# Patient Record
Sex: FEMALE | Race: WHITE
[De-identification: names, ages, dates, MRNs, and addresses within clinical notes are randomized per-mention and may not be internally consistent; named-entity substitution may affect disease eponyms.]

---

## 2019-04-04 ENCOUNTER — HOSPITAL ENCOUNTER (EMERGENCY)
Dept: HOSPITAL 62 - ER | Age: 24
Discharge: HOME | End: 2019-04-04
Payer: SELF-PAY

## 2019-04-04 VITALS — DIASTOLIC BLOOD PRESSURE: 63 MMHG | SYSTOLIC BLOOD PRESSURE: 96 MMHG

## 2019-04-04 DIAGNOSIS — R19.7: ICD-10-CM

## 2019-04-04 DIAGNOSIS — N75.0: ICD-10-CM

## 2019-04-04 DIAGNOSIS — R11.10: Primary | ICD-10-CM

## 2019-04-04 LAB
ABSOLUTE LYMPHOCYTES# (MANUAL): 0.2 10^3/UL (ref 0.5–4.7)
ABSOLUTE MONOCYTES # (MANUAL): 0.5 10^3/UL (ref 0.1–1.4)
ABSOLUTE NEUTROPHILS# (MANUAL): 15 10^3/UL (ref 1.7–8.2)
ADD MANUAL DIFF: YES
ALBUMIN SERPL-MCNC: 5.4 G/DL (ref 3.5–5)
ALP SERPL-CCNC: 96 U/L (ref 38–126)
ALT SERPL-CCNC: 75 U/L (ref 9–52)
ANION GAP SERPL CALC-SCNC: 15 MMOL/L (ref 5–19)
APPEARANCE UR: (no result)
APTT PPP: (no result) S
AST SERPL-CCNC: 60 U/L (ref 14–36)
BASOPHILS NFR BLD MANUAL: 0 % (ref 0–2)
BILIRUB DIRECT SERPL-MCNC: 0.4 MG/DL (ref 0–0.4)
BILIRUB SERPL-MCNC: 1.3 MG/DL (ref 0.2–1.3)
BILIRUB UR QL STRIP: NEGATIVE
BUN SERPL-MCNC: 21 MG/DL (ref 7–20)
CALCIUM: 10.7 MG/DL (ref 8.4–10.2)
CHLORIDE SERPL-SCNC: 105 MMOL/L (ref 98–107)
CO2 SERPL-SCNC: 24 MMOL/L (ref 22–30)
EOSINOPHIL NFR BLD MANUAL: 0 % (ref 0–6)
ERYTHROCYTE [DISTWIDTH] IN BLOOD BY AUTOMATED COUNT: 14.1 % (ref 11.5–14)
GLUCOSE SERPL-MCNC: 125 MG/DL (ref 75–110)
GLUCOSE UR STRIP-MCNC: 50 MG/DL
HCT VFR BLD CALC: 49.5 % (ref 36–47)
HGB BLD-MCNC: 16.8 G/DL (ref 12–15.5)
KETONES UR STRIP-MCNC: NEGATIVE MG/DL
LIPASE SERPL-CCNC: 24.6 U/L (ref 23–300)
MCH RBC QN AUTO: 28.6 PG (ref 27–33.4)
MCHC RBC AUTO-ENTMCNC: 33.9 G/DL (ref 32–36)
MCV RBC AUTO: 84 FL (ref 80–97)
MONOCYTES % (MANUAL): 3 % (ref 3–13)
NEUTS BAND NFR BLD MANUAL: 1 % (ref 3–5)
NITRITE UR QL STRIP: NEGATIVE
PH UR STRIP: 6 [PH] (ref 5–9)
PLATELET # BLD: 242 10^3/UL (ref 150–450)
PLATELET COMMENT: ADEQUATE
POTASSIUM SERPL-SCNC: 4.9 MMOL/L (ref 3.6–5)
PROT SERPL-MCNC: 9.3 G/DL (ref 6.3–8.2)
PROT UR STRIP-MCNC: 100 MG/DL
RBC # BLD AUTO: 5.86 10^6/UL (ref 3.72–5.28)
RBC MORPH BLD: (no result)
SEGMENTED NEUTROPHILS % (MAN): 95 % (ref 42–78)
SODIUM SERPL-SCNC: 143.5 MMOL/L (ref 137–145)
SP GR UR STRIP: 1.03
TOTAL CELLS COUNTED BLD: 100
UROBILINOGEN UR-MCNC: NEGATIVE MG/DL (ref ?–2)
VARIANT LYMPHS NFR BLD MANUAL: 1 % (ref 13–45)
WBC # BLD AUTO: 15.6 10^3/UL (ref 4–10.5)
WBC TOXIC VACUOLES BLD QL SMEAR: PRESENT

## 2019-04-04 PROCEDURE — 85025 COMPLETE CBC W/AUTO DIFF WBC: CPT

## 2019-04-04 PROCEDURE — 87205 SMEAR GRAM STAIN: CPT

## 2019-04-04 PROCEDURE — 87045 FECES CULTURE AEROBIC BACT: CPT

## 2019-04-04 PROCEDURE — 87086 URINE CULTURE/COLONY COUNT: CPT

## 2019-04-04 PROCEDURE — S0119 ONDANSETRON 4 MG: HCPCS

## 2019-04-04 PROCEDURE — 87493 C DIFF AMPLIFIED PROBE: CPT

## 2019-04-04 PROCEDURE — 81001 URINALYSIS AUTO W/SCOPE: CPT

## 2019-04-04 PROCEDURE — 36415 COLL VENOUS BLD VENIPUNCTURE: CPT

## 2019-04-04 PROCEDURE — 83690 ASSAY OF LIPASE: CPT

## 2019-04-04 PROCEDURE — 74177 CT ABD & PELVIS W/CONTRAST: CPT

## 2019-04-04 PROCEDURE — 96360 HYDRATION IV INFUSION INIT: CPT

## 2019-04-04 PROCEDURE — 80053 COMPREHEN METABOLIC PANEL: CPT

## 2019-04-04 PROCEDURE — 81025 URINE PREGNANCY TEST: CPT

## 2019-04-04 PROCEDURE — 99284 EMERGENCY DEPT VISIT MOD MDM: CPT

## 2019-04-04 NOTE — ER DOCUMENT REPORT
ED GI/





- General


TRAVEL OUTSIDE OF THE [place] IN LAST 30 DAYS: No





<JESSIE WALTERS - Last Filed: 04/04/19 08:05>





<CORY MCCOY - Last Filed: 04/04/19 12:36>





- General


Chief Complaint: Nausea/Vomiting/Diarrhea


Stated Complaint: NAUSEA


Time Seen by Provider: 04/04/19 05:03


Notes: 





Patient is a 23-year-old female presents to the emergency department for upwards

of 10 episodes of vomiting and upwards of 10 episodes of diarrhea nonbloody in 

both since 1800 hrs.  Patient is denying fever.  Patient's denying any dysuria 

or vaginal discharge.  Patient is complaining of generalized right lower 

quadrant pain.  According to EMS reports she got 8 mg of ODT Zofran.  Patient s

tates she has had no further episodes of vomiting since administration of 

Zofran.  Patient states she did recently undergo a thyroidectomy with lymph node

removal on 3/29.





Past medical history: Thyroid cancer


Medications: Synthroid


Allergies: None (JESSIE WALTERS)





Past Medical History





- General


Information source: Patient, Emergency Med Personnel





- Social History


Smoking Status: Unknown if Ever Smoked


Family History: Reviewed & Not Pertinent


Patient has suicidal ideation: No


Patient has homicidal ideation: No


Renal/ Medical History: Denies: Hx Peritoneal Dialysis





<JESSIE WALTERS - Last Filed: 04/04/19 08:05>





Review of Systems





- Review of Systems


Constitutional: See HPI


EENT: No symptoms reported


Cardiovascular: No symptoms reported


Respiratory: No symptoms reported


Gastrointestinal: See HPI


Genitourinary: See HPI


Female Genitourinary: See HPI, Last menstrual period - Currently


Musculoskeletal: No symptoms reported


Skin: No symptoms reported


Hematologic/Lymphatic: No symptoms reported


Neurological/Psychological: No symptoms reported





<JESSIE WALTERS - Last Filed: 04/04/19 08:05>





Physical Exam





<JESSIE WALTERS - Last Filed: 04/04/19 08:05>





- Vital signs


Vitals: 





                                        











Temp Pulse Resp BP Pulse Ox


 


 98.3 F   122 H  18   97/67 L  98 


 


 04/04/19 02:34  04/04/19 02:34  04/04/19 02:34  04/04/19 02:34  04/04/19 02:34














- Notes


Notes: 





GENERAL: Alert, interacts well. No acute distress.


HEAD: Normocephalic, atraumatic.


EYES: Pupils equal, round, and reactive to light. Extraocular movements intact.


ENT: Oral mucosa moist, tongue midline. 


NECK: Full range of motion. Supple. Trachea midline.


LUNGS: Clear to auscultation bilaterally, no wheezes, rales, or rhonchi. No 

respiratory distress.


HEART: Tachycardic rate and rhythm. No murmur


ABDOMEN: Soft, Non-distended. Bowel sounds present in all 4 quadrants.  Positive

McBurney's point tenderness, no Bay sign noted.


EXTREMITIES: Moves all 4 extremities spontaneously. No edema, normal radial and 

dorsalis pedis pulses bilaterally. No cyanosis.


BACK: no cervical, thoracic, lumbar midline tenderness. No saddle anesthesia, 

normal distal neurovascular exam. 


NEUROLOGICAL: Alert and oriented x3. Normal speech. cranial nerves II through 

XII grossly intact 


PSYCH: Normal affect, normal mood.


SKIN: Warm, dry, normal turgor. No rashes or lesions noted.


 (JESSIE WALTERS)





Course





- Laboratory


Result Diagrams: 


                                 04/04/19 05:23





                                 04/04/19 05:23





<JESSIE WALTERS - Last Filed: 04/04/19 08:05>





- Laboratory


Result Diagrams: 


                                 04/04/19 05:23





                                 04/04/19 05:23





<CORY MCCYO - Last Filed: 04/04/19 12:36>





- Re-evaluation


Re-evalutation: 


Patient does have a leukocytosis of 15.6, hemoglobin hematocrit 16.8 and 49.5 

respectively.  This could be due to her dehydration status.  Patient's urine 

does show signs of dehydration but no signs of urinary tract infection.  

Discussed with patient due to her right lower quadrant abdominal pain I would 

like to do imaging study to rule out appendicitis.  Discussed use of ultrasound 

versus CT.  Patient states she wishes for CT at this time states she does not 

want to have an ultrasound.


Patient has been able to p.o. oral contrast with no further episodes of 

vomiting.  Currently awaiting CT results.


04/04/19 08:05


Patient is now hemodynamically stable, no longer tachycardic after fluid 

administration in the emergency room.  Denying any need for pain management at 

this time.


Patient is POing oral contrast with no further episodes of vomiting.


Patient care and report transferred to KIERSTEN Mccoy PA-C for further care. 

(JESSIE WALTERS)





04/04/19 12:23


This is Watson Mccoy physician assistant at 8 AM this morning I took control of 

patient's care from just provider Fabian who informed me that we are waiting

on a CT with IV and oral contrast for nausea and vomiting that she undertaken 

for the past 12 hours she had had 10 bouts of vomiting to 10 bouts of diarrhea. 

She had no history of recent antibiotic use.  However on physical exam she had 

right lower quadrant pain and tenderness so CT was to rule out appendicitis.  CT

actually showed some ovarian follicles and also showed there an area that 

appeared to be a Bartholin gland cyst.  My pelvic exam did not find any blatant 

Bartholin gland cyst that could be seen or palpated.  I discussed the case with 

Dr. Figueroa and he looked at the CT and figured that this was more of an internal 

type of a presentation something we could not get too out of your and that we 

could treat it with antibiotics and let her go home to follow-up with her GYN.  

As far as the 12,000 white count goes this could be related to the Bartholin 

gland cyst and/or also to demargination.  Given that patient has perked up and 

that the Bartholin gland cyst or simply can be addressed with antibiotics we 

will discharge patient home.  She has not had any vomiting here since she has 

had a few bouts of minor diarrhea but she looks much better. (CORY MCCOY)





- Vital Signs


Vital signs: 





                                        











Temp Pulse Resp BP Pulse Ox


 


 98.4 F   98   18   104/66   98 


 


 04/04/19 07:24  04/04/19 07:24  04/04/19 07:24  04/04/19 07:24  04/04/19 07:24














- Laboratory


Laboratory results interpreted by me: 





                                        











  04/04/19 04/04/19 04/04/19





  05:06 05:23 05:23


 


WBC   15.6 H 


 


RBC   5.86 H 


 


Hgb   16.8 H 


 


Hct   49.5 H 


 


RDW   14.1 H 


 


Seg Neuts % (Manual)   95 H 


 


Band Neutrophils %   1 L 


 


Lymphocytes % (Manual)   1 L 


 


Abs Neuts (Manual)   15.0 H 


 


Abs Lymphs (Manual)   0.2 L 


 


BUN    21 H


 


Glucose    125 H


 


Calcium    10.7 H


 


AST    60 H


 


ALT    75 H


 


Total Protein    9.3 H


 


Albumin    5.4 H


 


Urine Protein  100 H  


 


Urine Glucose (UA)  50 H  


 


Urine Blood  LARGE H  














Discharge





<JESSIE WALTERS - Last Filed: 04/04/19 08:05>





<CORY MCCOY - Last Filed: 04/04/19 12:36>





- Discharge


Clinical Impression: 


 Vomiting and diarrhea, Bartholin gland cyst





Diarrhea


Qualifiers:


 Diarrhea type: unspecified type Qualified Code(s): R19.7 - Diarrhea, 

unspecified





Condition: Good


Disposition: HOME, SELF-CARE


Instructions:  Acetaminophen, Bartholin Gland Cyst or Abscess ([facility])


Additional Instructions: 


Home and rest.  Medications prescribed.  Clear liquids for 24 hours and then 

advance as tolerated.  Contact your OB/GYN tomorrow for follow-up ASAP please 

address this internal Bartholin gland cyst.  Take all the antibiotics please 

give increasing symptoms or worsening the pain return to ER for recheck.


Prescriptions: 


Doxycycline Hyclate 100 mg PO BID #20 capsule


Hyoscyamine Sulfate [Levsin-Sl] 0.125 mg SL Q4 PRN #30 tab.subl


 PRN Reason: 


Ondansetron [Zofran Odt 4 mg Tablet] 1 - 2 tab PO Q4H PRN #15 tab.rapdis


 PRN Reason: For Nausea/Vomiting

## 2019-04-04 NOTE — ER DOCUMENT REPORT
Doctor's Note


Notes: 





I personally and independently obtained patient history and examined the patient

in conjunction with the APC and agree with the assessment, treatment plan and 

disposition of the patient as recorded by the APC, and have reviewed the APC's 

note.





HISTORY OF PRESENT ILLNESS:


Patient is a 23-year-old female that presents to the emergency department for 

chief complaint of nausea, vomiting and diarrhea.  Patient reports the symptoms 

have been going on for a few days, had some mild associated abdominal pain as 

well.





ROS:


Constitutional: Negative for fever.


Cardiovascular: Negative for chest pain.


Respiratory: Negative for shortness of breath.


Gastrointestinal: Positive for vomiting and abdominal pain


Musculoskeletal: Negative for arm, leg or back pain


Skin: Negative for rash.


Neurological: Negative for weakness or numbness.





Other than noted above, the 12 point review of systems was reviewed with the 

patient and were negative, all pertinent findings are included in the HPI.








PHYSICAL EXAMINATION:





Vital signs reviewed, nursing noted reviewed. 





GENERAL: Well-appearing, well-nourished and in no acute distress.





HEAD: Atraumatic, normocephalic.





EYES: Eyes appear normal, conjunctiva are normal.





ENT: nares patent, oropharynx clear without exudates.  Moist mucous membranes.





NECK: Normal range of motion, supple without lymphadenopathy, well-healed 

surgical incisional scar, no signs of infection





LUNGS: Breath sounds clear to auscultation bilaterally and equal.  No wheezes 

rales or rhonchi.





HEART: Regular rate and rhythm without murmurs





EXTREMITIES: Nontender, good range of motion, no pitting or edema.  





NEUROLOGICAL: No focal neurological deficits. Moves all extremities 

spontaneously Motor and sensory grossly intact on exam.





PSYCH: Normal mood, normal affect.





SKIN: Warm, Dry, normal turgor, no rashes or lesions noted on exposed skin





MEDICAL DECISION MAKING:





Patient seen and examined, vital signs reviewed, patient appears well on my 

examination, she did receive Zofran for her nausea and that has resolved, she 

did have an episode of diarrhea in the emergency department, nonbloody, but 

otherwise was feeling better, CT imaging demonstrated a Bartholin cyst, however 

was unable to be palpated on exam per PAs exam, will refer to gynecology, 

patient does have a gynecologist, this is more of an incidental finding, will 

give her antibiotics and have her follow-up.  Patient agreeable and discharged 

home.





Diagnosis most likely nausea, vomiting and diarrhea, nonspecific





Please review detail APC documentation. 





*Note is created using voice recognition software and may contain spelling, 

syntax or grammatical errors.

## 2019-04-04 NOTE — RADIOLOGY REPORT (SQ)
EXAM DESCRIPTION:  CT ABD/PELVIS WITH IV   ORAL



COMPLETED DATE/TIME:  4/4/2019 8:40 am



REASON FOR STUDY:  RLQ pain



COMPARISON:  None.



TECHNIQUE:  CT scan of the abdomen and pelvis performed using helical scanning technique with dynamic
 intravenous contrast injection.  No oral contrast. Images reviewed with lung, soft tissue, and bone 
windows. Reconstructed coronal and sagittal MPR images reviewed. Delayed images for evaluation of the
 urinary system also acquired. All images stored on PACS.

All CT scanners at this facility use dose modulation, iterative reconstruction, and/or weight based d
osing when appropriate to reduce radiation dose to as low as reasonably achievable (ALARA).

[facility]: Dose Right  [facility]: CareDose    [facility]: Dose Right    [facility]: Teradose 4D    [facility]: Smart Technologies



CONTRAST TYPE AND DOSE:  contrast/concentration: Isovue 350.00 mg/ml; Total Contrast Delivered: 55.0 
ml; Total Saline Delivered: 65.0 ml



RENAL FUNCTION:  Creatinine - 0.89

BUN=21



RADIATION DOSE:  CT Rad equipment meets quality standard of care and radiation dose reduction techniq
ues were employed. CTDIvol: 5.0 - 5.6 mGy. DLP: 535 mGy-cm..



LIMITATIONS:  None.



FINDINGS:  LOWER CHEST: No significant findings. No nodules or infiltrates.

LIVER: Normal size.  Benign finding in the medial segment of the left lobe of the liver near the liga
ment.  No masses.  No dilated ducts.

SPLEEN: Normal size. No focal lesions.

PANCREAS: No masses. No significant calcifications. No adjacent inflammation or peripancreatic fluid 
collections. Pancreatic duct not dilated.

GALLBLADDER: No identified stones by CT criteria. No inflammatory changes to suggest cholecystitis.

ADRENAL GLANDS: No significant masses or asymmetry.

RIGHT KIDNEY AND URETER:  A 3-4 mm calculus in the mid pole of the right kidney.  No solid masses.

LEFT KIDNEY AND URETER: No solid masses.   No significant calcifications.   No hydronephrosis or hydr
oureter.

AORTA AND VESSELS: No aneurysm. No dissection. Renal arteries, SMA, celiac without stenosis.

RETROPERITONEUM: No retroperitoneal adenopathy, hemorrhage or masses.

BOWEL AND PERITONEAL CAVITY: No masses or inflammatory changes. No free fluid or peritoneal masses.

APPENDIX: Normal.

PELVIS:  The uterus is retroverted/retroflexed.  A 1.9 cm in diameter hypoattenuated structure in the
 left ovary, may represent left ovarian dominant follicle.  Small hypoattenuated structures in the ri
ght adnexal region, may represent ovarian follicles.  A well-circumscribed 2.3 cm x 1.9 cm cystic mas
s in the midline to the right vaginal wall.  Considerations for this finding includes Bartholin's gla
nd cyst.  No free fluid. Normal bladder.

ABDOMINAL WALL: No masses. No hernias.

BONES: No significant or acute findings.

OTHER: No other significant finding.



IMPRESSION:  1.  Small hypoattenuated structures in the right adnexal region may represent right ovar
lily follicles.  A dominant left ovarian follicle is noted.

2.  A well-circumscribed cystic mass in the midline to the right vaginal wall.  Considerations for th
is finding includes Bartholin's gland cyst.

3.  Small nonobstructing right renal calculus.



TECHNICAL DOCUMENTATION:  JOB ID:  [id number]

Quality ID # [id number]: Final reports with documentation of one or more dose reduction techniques (e.g., Au
tomated exposure control, adjustment of the mA and/or kV according to patient size, use of iterative 
reconstruction technique)

 2011 [email or web address]- All Rights Reserved



Reading location - IP/workstation name: [email or web address]